# Patient Record
Sex: FEMALE | Race: WHITE | URBAN - METROPOLITAN AREA
[De-identification: names, ages, dates, MRNs, and addresses within clinical notes are randomized per-mention and may not be internally consistent; named-entity substitution may affect disease eponyms.]

---

## 2017-11-30 ENCOUNTER — OFFICE VISIT (OUTPATIENT)
Dept: INTERNAL MEDICINE | Facility: CLINIC | Age: 22
End: 2017-11-30

## 2017-11-30 VITALS
RESPIRATION RATE: 20 BRPM | SYSTOLIC BLOOD PRESSURE: 130 MMHG | WEIGHT: 170.1 LBS | DIASTOLIC BLOOD PRESSURE: 86 MMHG | BODY MASS INDEX: 27.45 KG/M2 | OXYGEN SATURATION: 98 % | HEART RATE: 86 BPM

## 2017-11-30 DIAGNOSIS — F32.1 MAJOR DEPRESSIVE DISORDER, SINGLE EPISODE, MODERATE (H): ICD-10-CM

## 2017-11-30 ASSESSMENT — ANXIETY QUESTIONNAIRES
6. BECOMING EASILY ANNOYED OR IRRITABLE: MORE THAN HALF THE DAYS
2. NOT BEING ABLE TO STOP OR CONTROL WORRYING: SEVERAL DAYS
3. WORRYING TOO MUCH ABOUT DIFFERENT THINGS: MORE THAN HALF THE DAYS
1. FEELING NERVOUS, ANXIOUS, OR ON EDGE: SEVERAL DAYS
5. BEING SO RESTLESS THAT IT IS HARD TO SIT STILL: NOT AT ALL
GAD7 TOTAL SCORE: 7
IF YOU CHECKED OFF ANY PROBLEMS ON THIS QUESTIONNAIRE, HOW DIFFICULT HAVE THESE PROBLEMS MADE IT FOR YOU TO DO YOUR WORK, TAKE CARE OF THINGS AT HOME, OR GET ALONG WITH OTHER PEOPLE: SOMEWHAT DIFFICULT
7. FEELING AFRAID AS IF SOMETHING AWFUL MIGHT HAPPEN: SEVERAL DAYS

## 2017-11-30 ASSESSMENT — PATIENT HEALTH QUESTIONNAIRE - PHQ9
SUM OF ALL RESPONSES TO PHQ QUESTIONS 1-9: 17
5. POOR APPETITE OR OVEREATING: NOT AT ALL

## 2017-11-30 ASSESSMENT — PAIN SCALES - GENERAL: PAINLEVEL: NO PAIN (0)

## 2017-11-30 NOTE — PROGRESS NOTES
PRIMARY CARE CENTER       SUBJECTIVE:  Jermaine Iyer is a 22 year old female with a PMHx of depression who comes in for worsening depression    Jermaine was first seen here at the clinic in March of 2016 in her freshman year of college after moving from [place]. She was complaining of depressed mood and anxiety after her move. She was started on Zoloft, her symptoms improved. She went back to [place] for the summer and upon returning here to [place] for school she decided to stop the zoloft, thinking that her depression has resolved after going back home for the summer and now finding friends here in the [facility] and being part of a sorority. Since stopping the medication her depression symptoms remerged. She endorses increased sleep, ernesto eating, depressed mood, decreased interest in her regular activities, she had no interest in going out, starting skipping classes and she did poorly with her classes. She is lives in a sorority house with 4 other roommates and she has a lot of friends however she has no one close to open up to. She feels mostly supported through her family who live in [place].            Mood Symptoms     Concerns: depression    How long have you been feeling this way? 2 months since stopping medication      Description:   Depressed Mood?: YES     Anxious Mood?: No     Accompanying Signs & Symptoms:  Still participating in activities that you used to enjoy?: No  Fatigue:  YES     Irritability:  YES     Difficulty concentrating:  YES     Changes in appetite: No   Problems with sleep: No   ++++++++++++++++++++++++++++++++      Substance Use: Yes:         Alcohol Use:rare 1 drink a week                Other drug use:  Never Used    Social Support           Who do you live with? Lives with 4 roommates          Who do you turn to for support? Family live in [facility]         What makes you feel better?: -         What do you do to manage stress? -          Exercise:walking  4-5 days/week for an average of 15-30 minutes     History  Has there been a time in your life when you needed much less sleep than usual? No   Heart racing/beating fast : No   Thoughts of hurting yourself or others: No   Previous treatment Antidepressants - sertraline (Zoloft)                                  Therapy: No  Today's PHQ-9 Score:   PHQ-9 SCORE 11/30/2017   Total Score 17          MITCHEL Score:  MITCHEL-7 SCORE 11/30/2017   Total Score 7           Medications and allergies reviewed by me today.     ROS:   Constitutional, neuro, ENT, endocrine, pulmonary, cardiac, gastrointestinal, genitourinary, musculoskeletal, integument and psychiatric systems are negative, except as otherwise noted.      OBJECTIVE:/86  Pulse 86  Resp 20  Wt 77.2 kg (170 lb 1.6 oz)  SpO2 98%  BMI 27.45 kg/m2   Wt Readings from Last 1 Encounters:   11/30/17 77.2 kg (170 lb 1.6 oz)       GENERAL APPEARANCE: Tearful, healthy, alert and no distress     EYES: EOMI, PERRL     HENT: ear canals and TM's normal and nose and mouth without ulcers or lesions     NECK: no adenopathy, no asymmetry, masses, or scars and thyroid normal to palpation     RESP: lungs clear to auscultation - no rales, rhonchi or wheezes     CV: regular rates and rhythm, no murmurs or rub     ABDOMEN:  soft, nontender, bowel sounds normal     MS: extremities normal- no gross deformities noted, FROM in all extremities.     SKIN: no suspicious lesions or rashes     NEURO: Normal strength and tone, sensory exam grossly normal, mentation intact and speech normal     PSYCH: mentation appears normal. and affect normal/bright     LYMPHATICS: No cervical, or supraclavicular nodes     ASSESSMENT/PLAN:    Jermaine was seen today for depression.    Diagnoses and all orders for this visit:    Major depressive disorder, single episode, moderate (H)  Depressed mood started after moving here for college in 9/2015, seen at the clinic in March 2016, started on zoloft,  symptoms impoved which prompt her to stop her medication on her own upon returning from [place] for summer vacation. Now returning with the same complains she was seen for in the clinic last year (depressed mood, tearful, decreased interest, ernesto eating, skipping classes, and increased sleep). Will restart her Zoloft. Patient was counseled about not stopping her medication on her own and that she would at least need to take it for a year before considering stopping the zoloft.     Orders:  -     sertraline (ZOLOFT) 50 MG tablet; Take 1 tablet (50 mg) by mouth daily  -     PSYCHOLOGY REFERRAL       Pt should return to clinic for f/u with me in 6 weeks     Kevin Feliciano MD  Nov 30, 2017    Pt was seen and plan of care discussed with Dr perez who agrees with my assessment and plan .     Attestation:  I, Jana Ngo, saw this patient with the resident and agree with the resident s findings and plan of care as documented in the resident s note.      Jana Ngo MD

## 2017-11-30 NOTE — PATIENT INSTRUCTIONS
Please schedule the following appointments:  Health Psychology (Dr. Jade Squires) [phone]  Health Psychology (Dr. Julio Georges) [phone]   Health Psychology (Dr. Citlali Kim) [phone]   Health Psychology (Dr. Noemi Hanks) [phone]  Health Psychology (Dr. Altaf Lee) [phone]

## 2017-11-30 NOTE — MR AVS SNAPSHOT
After Visit Summary   11/30/2017    Jermaine Iyer    MRN: [id number]           Patient Information     Date Of Birth          1995        Visit Information        Provider Department      11/30/2017 8:35 AM Kevin Feliciano MD [facility]        Today's Diagnoses     Major depressive disorder, single episode, moderate (H)          Care Instructions    Please schedule the following appointments:  Health Psychology (Dr. Jade Squires) [phone]  Health Psychology (Dr. Julio Georges) [phone]   Health Psychology (Dr. Citlali Kim) [phone]   Health Psychology (Dr. Noemi Hanks) [phone]  Health Psychology (Dr. Altaf Lee) [phone]             Follow-ups after your visit        Additional Services     PSYCHOLOGY REFERRAL       Your provider has referred you to:  PREFERRED PROVIDERS:    Please be aware that coverage of these services is subject to the terms and limitations of your health insurance plan.  Call member services at your health plan with any benefit or coverage questions.      Please bring the following to your appointment:    >>   Any x-rays, CTs or MRIs which have been performed.  Contact the facility where they were done to arrange for  prior to your scheduled appointment.   >>   List of current medications   >>   This referral request   >>   Any documents/labs given to you for this referral                  Your next 10 appointments already scheduled     Jan 18, 2018  1:05 PM CST   (Arrive by 12:50 PM)   Return Visit with Kevin Feliciano MD   [facility] ([facility])    [address] [place] [id number] [phone]              Who to contact     Please call your clinic at [phone] to:    Ask questions about your health    Make or cancel appointments    Discuss your medicines    Learn about your test results    Speak to your doctor   If you have compliments or concerns  about an experience at your clinic, or if you wish to file a complaint, please contact [facility] Patient Relations at [phone] or email us at [email or web address]         Additional Information About Your Visit        [employer] Information     Help.com is an electronic gateway that provides easy, online access to your medical records. With Help.com, you can request a clinic appointment, read your test results, renew a prescription or communicate with your care team.     To sign up for Help.com visit the website at [email or web address]   You will be asked to enter the access code listed below, as well as some personal information. Please follow the directions to create your username and password.     Your access code is: [email or web address]  Expires: 2018  6:31 AM     Your access code will  in 90 days. If you need help or a new code, please contact your [facility] or call [phone] for assistance.        Care EveryWhere ID     This is your Care EveryWhere ID. This could be used by other organizations to access your [place] medical records  [id number]        Your Vitals Were     Pulse Respirations Pulse Oximetry BMI (Body Mass Index)          86 20 98% 27.45 kg/m2         Blood Pressure from Last 3 Encounters:   17 130/86   16 107/72   16 124/89    Weight from Last 3 Encounters:   17 77.2 kg (170 lb 1.6 oz)   16 69.7 kg (153 lb 9.6 oz) (85 %)*   16 72.2 kg (159 lb 1.6 oz) (89 %)*     * Growth percentiles are based on CDC 2-20 Years data.              We Performed the Following     PSYCHOLOGY REFERRAL          Where to get your medicines      These medications were sent to [facility] [address] [place], [place] - [address]  [address] [facility] [address]     Phone:  [phone]     sertraline 50 MG tablet          Primary Care Provider Office Phone # Fax #    Ana Luisa Alfaro,  [phone]       [address]  [facility] [address]        Equal Access to Services     YESI FLORES : Rosa [place], nya colorado, brianjuli barnettarturjose castlemackjose, bharat ann-mariein hayaaosbaldo castlekeily douglass marco teresa. So [facility] [phone].    ATENCIÓN: Si habla español, tiene a foster disposición servicios gratuitos de asistencia lingüística. Llame al [phone].    We comply with applicable federal civil rights laws and [place] laws. We do not discriminate on the basis of race, color, national origin, age, disability, sex, sexual orientation, or gender identity.            Thank you!     Thank you for choosing [facility] PRIMARY CARE CLINIC  for your care. Our goal is always to provide you with excellent care. Hearing back from our patients is one way we can continue to improve our services. Please take a few minutes to complete the written survey that you may receive in the mail after your visit with us. Thank you!             Your Updated Medication List - Protect others around you: Learn how to safely use, store and throw away your medicines at www.disposemymeds.org.          This list is accurate as of: 11/30/17  9:12 AM.  Always use your most recent med list.                   Brand Name Dispense Instructions for use Diagnosis    sertraline 50 MG tablet    ZOLOFT    30 tablet    Take 1 tablet (50 mg) by mouth daily    Major depressive disorder, single episode, moderate (H)

## 2017-11-30 NOTE — NURSING NOTE
Chief Complaint   Patient presents with     Depression     Patient is here to discuss depression.      Berkley Rodriguez LPN at 8:34 AM on 11/30/2017.

## 2017-12-01 ASSESSMENT — ANXIETY QUESTIONNAIRES: GAD7 TOTAL SCORE: 7

## 2017-12-31 ENCOUNTER — HEALTH MAINTENANCE LETTER (OUTPATIENT)
Age: 22
End: 2017-12-31

## 2018-01-18 ENCOUNTER — OFFICE VISIT (OUTPATIENT)
Dept: INTERNAL MEDICINE | Facility: CLINIC | Age: 23
End: 2018-01-18
Payer: COMMERCIAL

## 2018-01-18 VITALS
RESPIRATION RATE: 16 BRPM | WEIGHT: 169.6 LBS | DIASTOLIC BLOOD PRESSURE: 73 MMHG | HEART RATE: 80 BPM | SYSTOLIC BLOOD PRESSURE: 111 MMHG | BODY MASS INDEX: 27.37 KG/M2

## 2018-01-18 DIAGNOSIS — F32.5 MAJOR DEPRESSIVE DISORDER WITH SINGLE EPISODE, IN FULL REMISSION (H): Primary | ICD-10-CM

## 2018-01-18 ASSESSMENT — ANXIETY QUESTIONNAIRES
GAD7 TOTAL SCORE: 1
6. BECOMING EASILY ANNOYED OR IRRITABLE: NOT AT ALL
7. FEELING AFRAID AS IF SOMETHING AWFUL MIGHT HAPPEN: NOT AT ALL
5. BEING SO RESTLESS THAT IT IS HARD TO SIT STILL: NOT AT ALL
IF YOU CHECKED OFF ANY PROBLEMS ON THIS QUESTIONNAIRE, HOW DIFFICULT HAVE THESE PROBLEMS MADE IT FOR YOU TO DO YOUR WORK, TAKE CARE OF THINGS AT HOME, OR GET ALONG WITH OTHER PEOPLE: NOT DIFFICULT AT ALL
1. FEELING NERVOUS, ANXIOUS, OR ON EDGE: NOT AT ALL
3. WORRYING TOO MUCH ABOUT DIFFERENT THINGS: SEVERAL DAYS
2. NOT BEING ABLE TO STOP OR CONTROL WORRYING: NOT AT ALL

## 2018-01-18 ASSESSMENT — PATIENT HEALTH QUESTIONNAIRE - PHQ9
5. POOR APPETITE OR OVEREATING: NOT AT ALL
SUM OF ALL RESPONSES TO PHQ QUESTIONS 1-9: 2

## 2018-01-18 ASSESSMENT — PAIN SCALES - GENERAL: PAINLEVEL: NO PAIN (0)

## 2018-01-18 NOTE — MR AVS SNAPSHOT
After Visit Summary   2018    Jermaine Iyer    MRN: [id number]           Patient Information     Date Of Birth          1995        Visit Information        Provider Department      2018 1:05 PM Kevin Feliciano MD [facility]        Today's Diagnoses     Major depressive disorder with single episode, in full remission (H)    -  1       Follow-ups after your visit        Follow-up notes from your care team     Return in about 3 months (around 2018) for Depression follow up.      Who to contact     Please call your clinic at [phone] to:    Ask questions about your health    Make or cancel appointments    Discuss your medicines    Learn about your test results    Speak to your doctor   If you have compliments or concerns about an experience at your clinic, or if you wish to file a complaint, please contact [facility] Patient Relations at [phone] or email us at [email or web address]         Additional Information About Your Visit        MyChart Information     VytronUS is an electronic gateway that provides easy, online access to your medical records. With VytronUS, you can request a clinic appointment, read your test results, renew a prescription or communicate with your care team.     To sign up for TenasiTecht visit the website at [email or web address]   You will be asked to enter the access code listed below, as well as some personal information. Please follow the directions to create your username and password.     Your access code is: [email or web address]  Expires: 2018  6:31 AM     Your access code will  in 90 days. If you need help or a new code, please contact your [facility] or call [phone] for assistance.        Care EveryWhere ID     This is your Care EveryWhere ID. This could be used by other organizations to access your [place] medical records  [id number]        Your  Vitals Were     Pulse Respirations Breastfeeding? BMI (Body Mass Index)          80 16 No 27.37 kg/m2         Blood Pressure from Last 3 Encounters:   01/18/18 111/73   11/30/17 130/86   04/07/16 107/72    Weight from Last 3 Encounters:   01/18/18 76.9 kg (169 lb 9.6 oz)   11/30/17 77.2 kg (170 lb 1.6 oz)   04/07/16 69.7 kg (153 lb 9.6 oz) (85 %)*     * Growth percentiles are based on CDC 2-20 Years data.              Today, you had the following     No orders found for display       Primary Care Provider Office Phone # Fax #    Ana Luisa Alfaro [phone]       [address] [address]        Equal Access to Services     YESI FLORES : Hadii es [place], waaxda miroslava, qaybta kaalmada miya, bharat lara . So [facility] [phone].    ATENCIÓN: Si habla español, tiene a foster disposición servicios gratuitos de asistencia lingüística. Meche al [phone].    We comply with applicable federal civil rights laws and [place] laws. We do not discriminate on the basis of race, color, national origin, age, disability, sex, sexual orientation, or gender identity.            Thank you!     Thank you for choosing [facility] PRIMARY CARE CLINIC  for your care. Our goal is always to provide you with excellent care. Hearing back from our patients is one way we can continue to improve our services. Please take a few minutes to complete the written survey that you may receive in the mail after your visit with us. Thank you!             Your Updated Medication List - Protect others around you: Learn how to safely use, store and throw away your medicines at www.disposemymeds.org.          This list is accurate as of: 1/18/18 11:59 PM.  Always use your most recent med list.                   Brand Name Dispense Instructions for use Diagnosis    sertraline 50 MG tablet    ZOLOFT    30 tablet    Take 1 tablet (50 mg) by mouth daily     Major depressive disorder, single episode, moderate (H)

## 2018-01-18 NOTE — PROGRESS NOTES
PRIMARY CARE CENTER       SUBJECTIVE:  Jermaine Iyer is a 22 year old female with a PMHx of depresssion coming here for follow up after restarting her Zoloft.   During the last encounter patient was tearful, endorsing depressed mood, loss of interest, increased sleep, difficulty concentrating and was skipping classes. She had abruptly discontinued her Zoloft on her on and thereafter she started experiencing worsening symptoms of depression. I restart Zoloft which has worked for her in the past and placed a psychology referral. 2-3 after restarting Zoloft she noticed a significant improvement of her symptoms. She feels more engaged, taking more classed, doing better at school. She is sleeping normal hours and is able to wake up early. She is no longer feeling fatigued and irritable. She denies suicidal or self harm ideation. Her PHQ-9 score during this encounter is 2 compared to 17  and MITCHEL-7 is 1 compared to 7 when I last saw her and prior to restarting Zoloft.     She currently has no active complains    Today's PHQ-9 Score: 2    PHQ-9 SCORE 11/30/2017   Total Score 17          MITCHEL Score: 1    MITCHEL-7 SCORE 11/30/2017   Total Score 7       Medications and allergies reviewed by me today.     ROS:   Constitutional, neuro, ENT, endocrine, pulmonary, cardiac, gastrointestinal, genitourinary, musculoskeletal, integument and psychiatric systems are negative, except as otherwise noted.      OBJECTIVE:/73  Pulse 80  Resp 16  Wt 76.9 kg (169 lb 9.6 oz)  Breastfeeding? No  BMI 27.37 kg/m2   Wt Readings from Last 1 Encounters:   01/18/18 76.9 kg (169 lb 9.6 oz)       GENERAL APPEARANCE: healthy, alert and no distress     EYES: EOMI, PERRL     RESP: lungs clear to auscultation     CV: RRR     ABDOMEN:  ND/NT     SKIN: no suspicious lesions or rashes     NEURO: AAOx3, moves all extremities equally      PSYCH: mentation appears normal. and affect normal/bright     ASSESSMENT/PLAN:    - Depression  Her  depression symptoms have significantly improved since I saw her on 11/30/17 and restarted Zoloft. PHQ-9 score is 2 from 17 previously. Patient was unable to follow up with psychology because no appointments were available prior to her winter break then she ended up not calling back. I encouraged Jermaine to continue to take her Zoloft for likely another 6 months- 1 year and not to stop it on her own. She was also encouraged to follow up with psychology at the closest opportunistic       Pt should return to clinic for f/u with me in 3 months    Kevin Feliciano MD   Internal Medicine, PGY-1  [facility]     Jan 18, 2018    Pt was seen and plan of care discussed with Dr Casillas.   I was present during the visit and the patient was seen and examined by me in conjunction with the resident.  I discussed the pertinent history, exam, results and plan with the resident and agree with the documentation above with the following exceptions: none.    Vivian Casillas MD

## 2018-01-18 NOTE — NURSING NOTE
Chief Complaint   Patient presents with     Depression     pt is here for a depression follow up        Cary Neumann CMA at 1:00 PM on 1/18/2018

## 2018-01-18 NOTE — PROGRESS NOTES
Rooming Note  Health Maintenance   Health Maintenance Due   Topic Date Due     CHLAMYDIA SCREENING  1995     PEDS DTAP/TDAP (1 - Tdap) 08/28/2004     HPV IMMUNIZATION (1 of 3 - Female 3 Dose Series) 08/28/2008     TETANUS IMMUNIZATION (SYSTEM ASSIGNED)  08/28/2015     DEPRESSION ACTION PLAN Q1 YR  08/28/2015     INFLUENZA VACCINE (SYSTEM ASSIGNED)  09/01/2017    The following immunizations were discussed, but NOT ordered:   - Influenza (flu shot)  The orders were not placed because: patient declined

## 2018-01-19 ASSESSMENT — ANXIETY QUESTIONNAIRES: GAD7 TOTAL SCORE: 1

## 2018-02-23 DIAGNOSIS — F32.1 MAJOR DEPRESSIVE DISORDER, SINGLE EPISODE, MODERATE (H): ICD-10-CM

## 2018-02-25 NOTE — TELEPHONE ENCOUNTER
sertraline (ZOLOFT) 50 MG tablet  Last Written Prescription Date:  11/30/17  Last Fill Quantity: 30,   # refills: 3  Last Office Visit 1/18/18  Future Office visit:  None

## 2018-03-08 ENCOUNTER — OFFICE VISIT (OUTPATIENT)
Dept: PSYCHOLOGY | Facility: CLINIC | Age: 23
End: 2018-03-08

## 2018-03-08 DIAGNOSIS — F54 PSYCHOLOGICAL FACTORS AFFECTING MORBID OBESITY (H): ICD-10-CM

## 2018-03-08 DIAGNOSIS — F33.1 MAJOR DEPRESSIVE DISORDER, RECURRENT, MODERATE (H): Primary | ICD-10-CM

## 2018-03-08 DIAGNOSIS — F50.814 BINGE-EATING DISORDER, IN PARTIAL REMISSION, MILD: ICD-10-CM

## 2018-03-08 DIAGNOSIS — E66.01 PSYCHOLOGICAL FACTORS AFFECTING MORBID OBESITY (H): ICD-10-CM

## 2018-03-08 NOTE — MR AVS SNAPSHOT
After Visit Summary   3/8/2018    Jermaine Iyer    MRN: [id number]           Patient Information     Date Of Birth          1995        Visit Information        Provider Department      3/8/2018 1:00 PM Jade Squires, PhD [facility]        Today's Diagnoses     Major depressive disorder, recurrent, moderate (H)    -  1    Psychological factors affecting morbid obesity (H)        Binge-eating disorder, in partial remission, mild           Follow-ups after your visit        Who to contact     Please call your clinic at [phone] to:    Ask questions about your health    Make or cancel appointments    Discuss your medicines    Learn about your test results    Speak to your doctor            Additional Information About Your Visit        MyChart Information     Plato Network[employer] is an electronic gateway that provides easy, online access to your medical records. With TerraWi, you can request a clinic appointment, read your test results, renew a prescription or communicate with your care team.     To sign up for [employer] Network[employer] visit the website at [email or web address]   You will be asked to enter the access code listed below, as well as some personal information. Please follow the directions to create your username and password.     Your access code is: [email or web address]  Expires: 2018  7:30 AM     Your access code will  in 90 days. If you need help or a new code, please contact your [facility] or call [phone] for assistance.        Care EveryWhere ID     This is your Care EveryWhere ID. This could be used by other organizations to access your [place] medical records  [id number]         Blood Pressure from Last 3 Encounters:   18 111/73   17 130/86   16 107/72    Weight from Last 3 Encounters:   18 76.9 kg (169 lb 9.6 oz)   17 77.2 kg (170 lb 1.6 oz)   16 69.7 kg (153 lb 9.6 oz) (85 %)*     * Growth  percentiles are based on [facility] 2-20 Years data.              Today, you had the following     No orders found for display       Primary Care Provider Office Phone # Fax #    Ana Luisa Alfaro [phone]       [address]  [facility] [address]        Equal Access to Services     YESI FLORES : Hadii aad ku hadasho Soomaali, waaxda luqadaha, qaybta kaalmada adeegyada, waxay idiin hayaan adeeg kharash latracy . So [facility] [phone].    ATENCIÓN: Si habla español, tiene a foster disposición servicios gratuitos de asistencia lingüística. Meche al [phone].    We comply with applicable federal civil rights laws and [place] laws. We do not discriminate on the basis of race, color, national origin, age, disability, sex, sexual orientation, or gender identity.            Thank you!     Thank you for choosing [facility] CL[facility]  for your care. Our goal is always to provide you with excellent care. Hearing back from our patients is one way we can continue to improve our services. Please take a few minutes to complete the written survey that you may receive in the mail after your visit with us. Thank you!             Your Updated Medication List - Protect others around you: Learn how to safely use, store and throw away your medicines at www.disposemymeds.org.          This list is accurate as of 3/8/18 11:59 PM.  Always use your most recent med list.                   Brand Name Dispense Instructions for use Diagnosis    sertraline 50 MG tablet    ZOLOFT    30 tablet    Take 1 tablet (50 mg) by mouth daily    Major depressive disorder, single episode, moderate (H)

## 2018-03-20 NOTE — PROGRESS NOTES
"Health Psychology                                      Department of Medicine                                           [facility] Mail Code 749    Citlali Kim, Ph.D., L.P. [phone]  [address] [place] [address] Altaf Lee, Ph.D.,  L.P. [phone]  [place] [address]  Julio Georges, Ph.D., A.B.P.P., L.P. [phone]    CHIEF COMPLAINT/REASON FOR VISIT  Psychological treatment for coping with recurrent depression, binge eating and overweight status with body image dissatisfaction.    Patient was seen today for a 60 minute psychotherapy session in clinical health psychology.        HISTORY OF PRESENT ILLNESS  The patient is a pleasant 22 year old single,  female who is a liban in college.  She first presented with symptoms consistent with a major depressive epidode in X and responding well to Zoloft 50mg.  She discontinued the medicaiton and was again experiencing symptoms of depression and came in for an evaluation.  She reported that she cries frequently \"for no reason\" and has difficulty motivating herself to get out of bed.  She has missed her college classes and has been declining social invitations.  She described procrastination and that she has very low motivation to complete tasks.  She described doing the minimum to stay afloat with her responsibilities.  She reported that she believes she is a disappointment to her parents because of her weight.  She reported that she sleeps from 11pm to 8am when she can, but that she works at 7am two days per week.      She also described a two year history of weight challenges.  She currently described body dissatisfaction.  She is at 175 pounds and prefers that her weight be at less than 150.  She reported that she comes from a family that is oriented toward fitness and health and that she is the only one who struggles to keep her weight in a healthy range.  She has a vacation with " her family coming up and she is concerned about her appearance goarn bathing suit.  She endorsed binge eating episodes and reported that she is aware that she turns to food as a comfort.  Although she denied stress eating, she reported that her ability to resist her problem food, chocolate, is lower when stressed.  She denied any problems with her weight until moving from [place] to [place] for college.  She described binge eating for the first few months after beginning her freshman year and gaining 20 pounds.  She denied binge eating to the same degree but continues to binge on 2 occasions per week.      She works part time in a retail store and has access to snack foods that have been difficult to avoid.  The patient is noted to voice comments that are self derogatory and discussion of self compassion and self acceptance was initiated.  She currently has a My Fitness Pal application on her phone but she has been using it only intermittently.  Discussed setting a weight loss caloric intake at 1250 calories in order to lose approximately one pound per week.  She reported that she has a gym in the building that she lives in but it is not appealing. She described a desire to obtain a car so that she might join another gym.  She has another family vacation coming up on May 13 and she would like to achieve weight loss before this trip.      She plans to return home to [place] for the summer and will again work in her [employer] company.  She reported that she enjoys this work and is looking forward to pursuing a nursing degree so that she might practice as a nurse practitioner and she plans to pursue a Master's degree in nursing.   She is taking liberal ed classes and is pursuing a nursing degree.    She described a good friendship network.  She is a member of a sorority and lives with her 3 best friends.     SYSTEMS REVIEW  Tobacco:  Patient denied history or current use.     PAST MEDICAL/SURGICAL  HISTORY  Patient denied ever having seen a psychologist, psychiatrist or other type of mental health provider but she was prescribed Zoloft 50mg by he primary.      SOCIAL HISTORY  SOCIAL/DEVELOPMENTAL HISTORY  The patient reported being born and raised in [place].  She reported having 2 younger sisters.  She described her childhood as great and reported that she has been a person who is very active, busy, happy, smiling and excited about her future.  Patient denied any history of being the victim of physical, emotional, or sexual abuse. Patient denied experiencing any learning difficulties in school, and stated that she graduated from  high school and had hoped to be admitted into an RN program but she was not.  Therefore, she is pursuing a liberal arts degree and is taking nursing classes and plans to get a Masters degree in nursing.  Patient denied any  service.  She is employed as a  in a retail store that is beneath her apartment.  She has 2 early morning shifts and works 15-20 hours per week.  She is primarily supported by her parents but she is responsible for her grocery money and incidentals.  She described enjoying her work and finding it easy.  She is enrolled in college full time.         Patient has had a boyfriend in the past but she is not in a relationship at this time.  She does not have children.  She reported that her close relationship with her mother and her roomates are her best support system at present.  Recreational activities are reported to include sorority activities and she attends weekly meetings and given that she is a part of a new college chapter, she is involved in several volunteer activities (toy drive for example).  .     FAMILY HISTORY  Patient denied any known history of psychological or psychiatric disorders in family.    MENTAL STATUS EXAMINATION  Appearance/Behavior: The patient was on time, appropriately groomed and dressed, and demonstrated good  eye contact. Her speech was clear and consistent, and there was no evidence of psychomotor agitation.   Consciousness/Orientation: Alert and oriented to person, place, time, and situation.   Cooperation/Reliability: The patient appeared to honestly respond to questions about psychosocial functioning. Patient is deemed a reliable historian.   Mood/Affect: Mood dysphoric with tearful affect.       Appetite: Patient described good appetite.    Sleep: Patient denied any difficulties falling asleep or staying asleep.    Body image: Patient endorsed weight concerns and body image dissatisfaction.    Speech/Language: Speech was logical and coherent. Speech was of normal rate, rhythm and volume.   Thought Form: Overall logical and organized   Thought Content:  Appropriate to interview and situation. Patient appeared focused on her health and concerns about depression and low motivation and the impact of these on her college coursework.   Perception: Patient denied any difficulties with a thought disorder, including auditory or visual hallucinations. Denied any history of obsessive-compulsive disorder or of maryjane.   Cognition/Memory: No difficulties apparent upon interview; not formally assessed.    Fund of Knowledge: Consistent with age, level of education, and life experience.   Abstract Reasoning: Appropriate; not formally assessed.  Judgment: No difficulties apparent upon interview.  Insight/Motivation: Appropriate to situation. The patient is seeking assistance for coping with depression and weight loss.  Suicide/Assault:  Patient denies suicidal or assaultive ideation, plan, or intent.    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  She presents for treatment of depression and possible binge eating disorder or weight loss issues.  She has been diagnosed with MDD in the past and was tretesd successfully with Zoloft.  Patient  reported her family and friend support and long term goals in nursing are her best coping strategies at this point.     Time In:  1:00  Time Out:  2:00    DIAGNOSIS:  Axis I Mdd, recurrent, moderate; R/o binge eating disorder   Axis II Deferred   Axis III Please see medical records for details.   Axis IV Psychosocial and Environmental Stressors: overweight and living away from family support     PLAN:  The following recommendations were made to the patient:    1. Follow-up Services: We recommended a follow-up appointment with Dr. Jade Squires in clinical health psychology for cognitive behavioral therapy to aid with adjustment to, and coping with, current depression and assistance in weight loss.     The patient appeared amenable to these recommendations and an appointment will be made with Dr. Jade Squires after patient returns from vacation. We remain available to the patient as needed.    PATIENT EDUCATION:  Ready to learn, no apparent learning barriers were identified; learning preferences include listening. Explained diagnosis and treatment plan; patient expressed understanding of the content.      Jade Squires, Ph.D., L.P.

## 2018-07-20 DIAGNOSIS — F32.1 MAJOR DEPRESSIVE DISORDER, SINGLE EPISODE, MODERATE (H): ICD-10-CM

## 2018-07-20 NOTE — TELEPHONE ENCOUNTER
Sertraline 50 mg tabs      Last Written Prescription Date:  2-27-18  Last Fill Quantity: 30,   # refills: 3  Last Office Visit : 1-18-18  Future Office visit:  none    30 day soha refill sent to the pharmacy - including instructions for patient to call the clinic and schedule an appointment.    Patient was informed she needs to call and schedule an appointment for further refills. She states understanding and that she will call to schedule.      Kathleen M Doege RN

## 2018-08-15 DIAGNOSIS — F32.1 MAJOR DEPRESSIVE DISORDER, SINGLE EPISODE, MODERATE (H): ICD-10-CM

## 2018-08-15 NOTE — TELEPHONE ENCOUNTER
zoloft    Last Written Prescription Date:  7/20/18  Last Fill Quantity: 30,   # refills: 0  Last Office Visit : 9/6/18  Future Office visit:  1/18/18    Routing refill request to nurse for review/approval because:  Drug failed the [facility], [facility] or [facility] refill protocol

## 2018-09-22 DIAGNOSIS — F32.1 MAJOR DEPRESSIVE DISORDER, SINGLE EPISODE, MODERATE (H): ICD-10-CM

## 2018-09-25 ENCOUNTER — OFFICE VISIT (OUTPATIENT)
Dept: INTERNAL MEDICINE | Facility: CLINIC | Age: 23
End: 2018-09-25
Payer: COMMERCIAL

## 2018-09-25 VITALS
BODY MASS INDEX: 26.63 KG/M2 | WEIGHT: 165 LBS | DIASTOLIC BLOOD PRESSURE: 78 MMHG | SYSTOLIC BLOOD PRESSURE: 118 MMHG | HEART RATE: 88 BPM

## 2018-09-25 DIAGNOSIS — F32.1 MAJOR DEPRESSIVE DISORDER, SINGLE EPISODE, MODERATE (H): ICD-10-CM

## 2018-09-25 ASSESSMENT — PAIN SCALES - GENERAL: PAINLEVEL: NO PAIN (0)

## 2018-09-25 NOTE — NURSING NOTE
Chief Complaint   Patient presents with     Depression     depression check up per pt       Vincenzo Hamilton, EMT 8:44 AM on 9/25/2018.

## 2018-09-25 NOTE — PROGRESS NOTES
"  [facility]      History of Present Illness:   Jermaine Iyer is a 23 year old female with a history of depression presenting for medication refill and check-up.    Reports she has overall been doing well in the past few months, and is primarily here in order to obtain a refill on her sertraline medication that she is prescribed for depression. She has not been feeling depressed recently and has been performing activities like studying for and taking the GRE and walking her dog. She endorses some mild anxiety with the college school year starting up (she is a senior at the [employer] studying health and wellness and is hoping to do a Master's for nursing) but feels as though she is managing her busy life much better than in the past. She sleeps more than usual and does still feel tired during the day sometimes now. No recent binge eating. She feels supported by her friends (her longtime childhood friend just transferred to the  and moved in with her). She does not endorse any SI/HI, chest pain, palpitations, panic attacks, headache, fever, nausea, vomiting. Has not gotten flu shot, and reports she gets flu-like symptoms (believes runs in the family). This summer went to [place], and went to 7 countries. [place], [place], [place] and others.    Depression Symptom Review  Sleep changes: sleeping more than usual  Interest: interested in social activities, dog, school  Guilt: not endorsed  Energy: daytime fatigue, \"tired all the time\"  Cognition/Concentration:   Appetite (wt. change):  Psychomotor [agitation (anxiety) or retardations (lethargic)]:  SI/HI: none  No headache, regular.      Assessment & Plan:   Jermaine Iyer is a 23 year old female with a history of depression presenting for medication refill and check-up.    Depression in remission  She appears to overall be doing well and is not currently meeting DSM-V criteria for depression. Still with some fatigue during the day and increased " "time sleeping, but otherwise no guilt, apathy, concentration difficulty, psychomotor retardation, SI/HI. She has been performing her daily activities, getting out of the house, moderating her food intake, walking her dog and seems to be responding well to Zoloft.   - Zoloft 50mg daily    BMI 26.6  Down four pounds from last visit. Exercising some and walking dog with no recent binge eating.   - Continue healthy diet, exercise and lifestyle change    Birth Control   - Continue norethindrone acet-ethinyl est    Thank you, Walter Vidales (PGY-1 )  [id number]        Review of Systems:   Review of systems completed and otherwise negative.      Past Medical History:     Past Medical History:   Diagnosis Date     Acute streptococcal pharyngitis 10/2015     Impetigo any site 10/2015    On chin after Streptococcal pharyngitis         Past Surgical History:     Past Surgical History:   Procedure Laterality Date     Birth kendall N/A 1996    Covered \"3/4 of forehead, 4 major surgeries at age 4yo\"        Social History:   Employment history: works at retail shop  Hobbies/ free-time activities: takes dogs for walks, goes shopping, watches netflix  Education: [employer]  Substance:    EtOH drinks per week: 3 every two weeks, max 5 so     Tobacco: never     Recreational drugs: none    IVDU: no  Sexually active, protection, previous STI:  No,     Social History     Social History     Marital status: Single     Spouse name: N/A     Number of children: N/A     Years of education: N/A     Occupational History     Not on file.     Social History Main Topics     Smoking status: Never Smoker     Smokeless tobacco: Never Used     Alcohol use 1.2 oz/week     2 Standard drinks or equivalent per week     Drug use: No     Sexual activity: No     Other Topics Concern     Not on file     Social History Narrative    Employment history: works at retail shop    Hobbies/ free-time activities: takes dogs for walks, goes shopping, watches netflix    Education: [employer] senior 2018 studying Wellness and hoping to do Master's in Nursing    Went to [place] summer 2018 and to multiple countries.        Family History:   No family history on file.     Allergies:    No Known Allergies       Medications:     Current Outpatient Prescriptions   Medication     Norethindrone Acet-Ethinyl Est (LOESTRIN 1.5/30, 21, PO)     sertraline (ZOLOFT) 50 MG tablet     [DISCONTINUED] sertraline (ZOLOFT) 50 MG tablet     No current facility-administered medications for this visit.           Physical Exam:   /78 (BP Location: Right arm, Patient Position: Sitting, Cuff Size: Adult Regular)  Pulse 88  Wt 74.8 kg (165 lb)  BMI 26.63 kg/m2  Wt:   Wt Readings from Last 4 Encounters:   09/25/18 74.8 kg (165 lb)   01/18/18 76.9 kg (169 lb 9.6 oz)   11/30/17 77.2 kg (170 lb 1.6 oz)   04/07/16 69.7 kg (153 lb 9.6 oz) (85 %)*     * Growth percentiles are based on CDC 2-20 Years data.      BP Readings from Last 6 Encounters:   09/25/18 118/78   01/18/18 111/73   11/30/17 130/86   04/07/16 107/72   03/03/16 124/89   09/19/15 122/79       Constitutional: not dyspneic/diaphoretic, no acute distress  Mood:  cooperative, pleasant  Eyes: Sclera anicteric/injected  Ears/nose/mouth/throat: Normal oropharynx without ulcers or exudate, mucus membranes moist, hearing intact  Neck: supple, thyroid normal size  CV: Regular rate and rhythm, no edema  Respiratory: Unlabored breathing  Lymph: No axillary, submandibular, supraclavicular or inguinal lymphadenopathy  Abd: Nondistended  Skin: warm, perfused, no jaundice  Neuro: AAO x 3  Psych: Normal affect  MSK: Normal gait      Lab and Data:   None      Pt was seen and examined with Dr. Vidales.  I agree with his documentation as noted above.    My additional comments: None    Mj Mcclellan

## 2018-09-25 NOTE — MR AVS SNAPSHOT
After Visit Summary   9/25/2018    Jermaine Iyer    MRN: [id number]           Patient Information     Date Of Birth          1995        Visit Information        Provider Department      9/25/2018 8:40 AM Walter Vidales MD [facility]        Today's Diagnoses     Major depressive disorder, single episode, moderate (H)          Care Instructions    Primary Care Center Medication Refill Request Information:  * Please contact your pharmacy regarding ANY request for medication refills.  ** [facility] Prescription Fax = [phone]  * Please allow 3 business days for routine medication refills.  * Please allow 5 business days for controlled substance medication refills.     Primary Care Center Test Result notification information:  *You will be notified with in 7-10 days of your appointment day regarding the results of your test.  If you are on MyChart you will be notified as soon as the provider has reviewed the results and signed off on them.    Primary Care Center: [phone]                [facility]         Internal Medicine Resident                   Continuity Clinic    Who We Are    Resident Continuity Clinic is a part of the [facility].  Resident physicians see patients independently and establish a relationship with them over the course of their three-year residency program.  As with the Primary Care Clinic, our Resident Continuity Clinic models a group practice.  If your doctor is not available, you will be able to see another resident physician.  At the end of a resident s training, patients will be transitioned to a new resident physician for ongoing care.     We treat patients with a wide array of medical needs from routine physicals, to acute illnesses, to diabetes and blood pressure management, to complex medical illness.  What is a Resident Physician?    Resident physicians hold medical degrees and are doctors. They are training to  become specialists in Internal Medicine. They work under the supervision of board-certified faculty physicians.  Expectations for Your Care    We strive to provide accessible, quality care at all times.    In order to provide this care, it is best to see your primary care resident doctor consistently rather switching between providers.  In the event you do see another physician, you should schedule a follow-up visit with your usual primary care doctor.    If you are transitioning your care from another clinic, it is helpful to have your records available for your doctor to review.    We do not prescribe controlled substances, such as ADD medications or narcotic pain medications, on your first visit.  We will review your health records and concerns prior to devising a treatment plan with you in order to provide the best care.      Clinic Services     Extended clinic hours; patient  to help navigate your visit;  parking; laboratory and imaging services with evening and weekend hours    Multiple medical and surgical specialties in one building    Complementary services, including Nutrition, Integrative Medicine, Pharmacy consultations, Mental and Behavioral Health, Sports Medicine and Physical Therapy    Thank You    We would like to thank you for choosing the [facility] Resident Continuity Clinic for your primary care. You are making a priceless contribution to the training of the next generation of health care practitioners.     Contact us at [phone] for appointments or questions.    Resident Clinic Hours are Tuesdays and Thursdays, 7:30am-5:00pm    Residents  Cary Vilchis MD   (Female )   Surekha Rhodes MD   (Female)   Lyle Armenta MD  (Male)   Aleksey Bowie MD  (Male)   Akosua Pinto MD   (Female)   Brock Whitney MD  (Male)    Tor Sands MD  (Male)   Herb Lorenzo MD  (Male)   Aleksey Ling MD (Male)   Kimo Jarrett MD  (Male)   Desire  MD Wilda (Female)    Briseyda Saucedo MD (Female)   Danielle Feliciano MD  (Male)   Maria L Ojeda MD(Female)   Clau Schaefer MD  (Female)    Supervising Physicians   MD Jana Veliz, MD Terry Roy, MD Mj Mcclellan, MD Tomeka Ferguson, MD Vivian Casillas, MD Rosalio Russ, MD Myrna Tineo, MD Yamileth Verduzco MD                    Follow-ups after your visit        Follow-up notes from your care team     Return in about 1 year (around 2019).      Who to contact     Please call your clinic at [phone] to:    Ask questions about your health    Make or cancel appointments    Discuss your medicines    Learn about your test results    Speak to your doctor            Additional Information About Your Visit        MyChart Information     [employer] is an electronic gateway that provides easy, online access to your medical records. With [employer], you can request a clinic appointment, read your test results, renew a prescription or communicate with your care team.     To sign up for [employer] visit the website at [email or web address]   You will be asked to enter the access code listed below, as well as some personal information. Please follow the directions to create your username and password.     Your access code is: [email or web address]  Expires: 2018  6:30 AM     Your access code will  in 90 days. If you need help or a new code, please contact your [facility] or call [phone] for assistance.        Care EveryWhere ID     This is your Care EveryWhere ID. This could be used by other organizations to access your [place] medical records  [id number]        Your Vitals Were     Pulse BMI (Body Mass Index)                88 26.63 kg/m2           Blood Pressure from Last 3 Encounters:   18 118/78   18 111/73   17 130/86    Weight from Last 3 Encounters:   18 74.8 kg (165 lb)   18 76.9 kg  (169 lb 9.6 oz)   11/30/17 77.2 kg (170 lb 1.6 oz)              Today, you had the following     No orders found for display         Today's Medication Changes          These changes are accurate as of 9/25/18  9:38 AM.  If you have any questions, ask your nurse or doctor.               These medicines have changed or have updated prescriptions.        Dose/Directions    sertraline 50 MG tablet   Commonly known as:  ZOLOFT   This may have changed:  See the new instructions.   Used for:  Major depressive disorder, single episode, moderate (H)   Changed by:  Walter Vidales MD        Dose:  50 mg   Take 1 tablet (50 mg) by mouth daily   Quantity:  30 tablet   Refills:  0            Where to get your medicines      These medications were sent to [place] [id number] [place] [facility] [place], [place] - [address]  [address] [facility] [address]     Phone:  [phone]     sertraline 50 MG tablet                Primary Care Provider Office Phone # Fax #    Ana Luisa Alfaro [phone]       [facility] [address] [facility] [address]  [facility] [address]        Equal Access to Services     [facility]: Hadii es hsu hadasho Sofiona, waaxda luqadaha, qaybta kaalmada adeoly, bharat teresa. So [facility] [phone].    ATENCIÓN: Si habla español, tiene a foster disposición servicios gratuitos de asistencia lingüística. Meche al [phone].    We comply with applicable federal civil rights laws and [place] laws. We do not discriminate on the basis of race, color, national origin, age, disability, sex, sexual orientation, or gender identity.            Thank you!     Thank you for choosing [facility]  for your care. Our goal is always to provide you with excellent care. Hearing back from our patients is one way we can continue to improve our services. Please take a few minutes to complete the written survey that you may receive in the mail after your  visit with us. Thank you!             Your Updated Medication List - Protect others around you: Learn how to safely use, store and throw away your medicines at www.disposemymeds.org.          This list is accurate as of 9/25/18  9:38 AM.  Always use your most recent med list.                   Brand Name Dispense Instructions for use Diagnosis    LOESTRIN 1.5/30 (21) PO           sertraline 50 MG tablet    ZOLOFT    30 tablet    Take 1 tablet (50 mg) by mouth daily    Major depressive disorder, single episode, moderate (H)

## 2018-10-30 ENCOUNTER — HEALTH MAINTENANCE LETTER (OUTPATIENT)
Age: 23
End: 2018-10-30

## 2019-02-04 DIAGNOSIS — F32.1 MAJOR DEPRESSIVE DISORDER, SINGLE EPISODE, MODERATE (H): ICD-10-CM

## 2019-05-25 DIAGNOSIS — F32.1 MAJOR DEPRESSIVE DISORDER, SINGLE EPISODE, MODERATE (H): ICD-10-CM

## 2019-05-28 NOTE — TELEPHONE ENCOUNTER
sertraline (ZOLOFT) 50 MG tablet      Last Written Prescription Date:  2/5/19  Last Fill Quantity: 90,   # refills: 0  Last Office Visit : 9/25/18  Future Office visit:  none    90 day to pharmacy. Patient instructed to call clinic for follow up appointment.        Scheduling has been notified to contact the pt for follow up appointment and PHQ9.

## 2019-09-10 DIAGNOSIS — F32.1 MAJOR DEPRESSIVE DISORDER, SINGLE EPISODE, MODERATE (H): ICD-10-CM

## 2019-09-13 NOTE — TELEPHONE ENCOUNTER
Last Clinic Visit: 9/25/2018  [facility]  Scheduling has been notified to contact the pt for appointment.

## 2019-09-16 NOTE — TELEPHONE ENCOUNTER
Tried calling patient, no answered. Left message with clinic number requesting patient to call back to schedule annual exam due this month.

## 2019-11-12 ENCOUNTER — TELEPHONE (OUTPATIENT)
Dept: FAMILY MEDICINE | Age: 24
End: 2019-11-12

## 2019-11-14 ENCOUNTER — APPOINTMENT (OUTPATIENT)
Dept: FAMILY MEDICINE | Age: 24
End: 2019-11-14

## 2019-11-18 ENCOUNTER — APPOINTMENT (OUTPATIENT)
Dept: FAMILY MEDICINE | Age: 24
End: 2019-11-18

## 2019-11-19 ENCOUNTER — OFFICE VISIT (OUTPATIENT)
Dept: FAMILY MEDICINE | Age: 24
End: 2019-11-19

## 2019-11-19 ENCOUNTER — LAB SERVICES (OUTPATIENT)
Dept: LAB | Age: 24
End: 2019-11-19

## 2019-11-19 VITALS
HEIGHT: 66 IN | RESPIRATION RATE: 16 BRPM | HEART RATE: 86 BPM | WEIGHT: 154.32 LBS | DIASTOLIC BLOOD PRESSURE: 80 MMHG | BODY MASS INDEX: 24.8 KG/M2 | SYSTOLIC BLOOD PRESSURE: 110 MMHG

## 2019-11-19 DIAGNOSIS — Z23 NEED FOR VACCINATION: ICD-10-CM

## 2019-11-19 DIAGNOSIS — Z00.00 ANNUAL PHYSICAL EXAM: Primary | ICD-10-CM

## 2019-11-19 DIAGNOSIS — Z11.1 SCREENING EXAMINATION FOR PULMONARY TUBERCULOSIS: ICD-10-CM

## 2019-11-19 DIAGNOSIS — Z86.59 H/O: DEPRESSION: ICD-10-CM

## 2019-11-19 PROCEDURE — 90686 IIV4 VACC NO PRSV 0.5 ML IM: CPT | Performed by: FAMILY MEDICINE

## 2019-11-19 PROCEDURE — 90471 IMMUNIZATION ADMIN: CPT | Performed by: FAMILY MEDICINE

## 2019-11-19 PROCEDURE — 99385 PREV VISIT NEW AGE 18-39: CPT | Performed by: FAMILY MEDICINE

## 2019-11-19 PROCEDURE — 36415 COLL VENOUS BLD VENIPUNCTURE: CPT | Performed by: INTERNAL MEDICINE

## 2019-11-19 PROCEDURE — 90472 IMMUNIZATION ADMIN EACH ADD: CPT | Performed by: FAMILY MEDICINE

## 2019-11-19 PROCEDURE — 86480 TB TEST CELL IMMUN MEASURE: CPT | Performed by: INTERNAL MEDICINE

## 2019-11-19 PROCEDURE — 90715 TDAP VACCINE 7 YRS/> IM: CPT | Performed by: FAMILY MEDICINE

## 2019-11-19 RX ORDER — NORETHINDRONE ACETATE AND ETHINYL ESTRADIOL AND FERROUS FUMARATE 1MG-20(21)
KIT ORAL
COMMUNITY
Start: 2019-11-07

## 2019-11-19 ASSESSMENT — PATIENT HEALTH QUESTIONNAIRE - PHQ9
SUM OF ALL RESPONSES TO PHQ9 QUESTIONS 1 AND 2: 0
SUM OF ALL RESPONSES TO PHQ9 QUESTIONS 1 AND 2: 0
1. LITTLE INTEREST OR PLEASURE IN DOING THINGS: NOT AT ALL
2. FEELING DOWN, DEPRESSED OR HOPELESS: NOT AT ALL

## 2019-11-20 LAB
GAMMA INTERFERON BACKGROUND BLD IA-ACNC: 0.01 IU/ML
M TB IFN-G BLD-IMP: NEGATIVE
M TB IFN-G CD4+ BCKGRND COR BLD-ACNC: 0.01 IU/ML
M TB IFN-G CD4+CD8+ BCKGRND COR BLD-ACNC: 0.01 IU/ML
MITOGEN IGNF BCKGRD COR BLD-ACNC: 8.85 IU/ML

## 2019-11-21 ENCOUNTER — TELEPHONE (OUTPATIENT)
Dept: FAMILY MEDICINE | Age: 24
End: 2019-11-21

## 2019-12-06 ENCOUNTER — TELEPHONE (OUTPATIENT)
Dept: FAMILY MEDICINE | Age: 24
End: 2019-12-06

## 2019-12-06 DIAGNOSIS — R76.0 HIGH SERUM VARICELLA ZOSTER VIRUS (VZV) ANTIBODY TITER: Primary | ICD-10-CM

## 2019-12-16 ENCOUNTER — LAB SERVICES (OUTPATIENT)
Dept: LAB | Age: 24
End: 2019-12-16

## 2019-12-16 ENCOUNTER — E-ADVICE (OUTPATIENT)
Dept: FAMILY MEDICINE | Age: 24
End: 2019-12-16

## 2019-12-16 DIAGNOSIS — R76.0 HIGH SERUM VARICELLA ZOSTER VIRUS (VZV) ANTIBODY TITER: ICD-10-CM

## 2019-12-16 PROCEDURE — 36415 COLL VENOUS BLD VENIPUNCTURE: CPT | Performed by: INTERNAL MEDICINE

## 2019-12-16 PROCEDURE — 86787 VARICELLA-ZOSTER ANTIBODY: CPT | Performed by: INTERNAL MEDICINE

## 2019-12-17 ENCOUNTER — E-ADVICE (OUTPATIENT)
Dept: FAMILY MEDICINE | Age: 24
End: 2019-12-17

## 2019-12-17 ENCOUNTER — TELEPHONE (OUTPATIENT)
Dept: FAMILY MEDICINE | Age: 24
End: 2019-12-17

## 2019-12-17 LAB — VZV IGG SER IA-ACNC: NORMAL

## 2019-12-18 ENCOUNTER — E-ADVICE (OUTPATIENT)
Dept: FAMILY MEDICINE | Age: 24
End: 2019-12-18

## 2020-01-22 ENCOUNTER — E-ADVICE (OUTPATIENT)
Dept: FAMILY MEDICINE | Age: 25
End: 2020-01-22

## 2021-01-12 ENCOUNTER — E-ADVICE (OUTPATIENT)
Dept: FAMILY MEDICINE | Age: 26
End: 2021-01-12

## 2021-01-12 DIAGNOSIS — Z11.1 SCREENING-PULMONARY TB: Primary | ICD-10-CM

## 2021-01-13 ENCOUNTER — APPOINTMENT (OUTPATIENT)
Dept: FAMILY MEDICINE | Age: 26
End: 2021-01-13

## 2021-01-14 ENCOUNTER — LAB SERVICES (OUTPATIENT)
Dept: LAB | Age: 26
End: 2021-01-14

## 2021-01-14 DIAGNOSIS — Z11.1 SCREENING-PULMONARY TB: ICD-10-CM

## 2021-01-14 PROCEDURE — 36415 COLL VENOUS BLD VENIPUNCTURE: CPT | Performed by: INTERNAL MEDICINE

## 2021-01-14 PROCEDURE — 86480 TB TEST CELL IMMUN MEASURE: CPT | Performed by: INTERNAL MEDICINE

## 2021-01-15 LAB
GAMMA INTERFERON BACKGROUND BLD IA-ACNC: 0.03 IU/ML
M TB IFN-G BLD-IMP: NEGATIVE
M TB IFN-G CD4+ BCKGRND COR BLD-ACNC: 0 IU/ML
M TB IFN-G CD4+CD8+ BCKGRND COR BLD-ACNC: 0 IU/ML
MITOGEN IGNF BCKGRD COR BLD-ACNC: >10 IU/ML

## 2021-01-18 ENCOUNTER — TELEPHONE (OUTPATIENT)
Dept: FAMILY MEDICINE | Age: 26
End: 2021-01-18

## 2021-04-28 ENCOUNTER — APPOINTMENT (OUTPATIENT)
Dept: LAB | Age: 26
End: 2021-04-28

## 2021-04-28 ENCOUNTER — OFFICE VISIT (OUTPATIENT)
Dept: FAMILY MEDICINE | Age: 26
End: 2021-04-28

## 2021-04-28 VITALS
WEIGHT: 148.26 LBS | RESPIRATION RATE: 16 BRPM | TEMPERATURE: 97.6 F | SYSTOLIC BLOOD PRESSURE: 110 MMHG | HEIGHT: 66 IN | HEART RATE: 70 BPM | BODY MASS INDEX: 23.83 KG/M2 | DIASTOLIC BLOOD PRESSURE: 68 MMHG | OXYGEN SATURATION: 96 %

## 2021-04-28 DIAGNOSIS — Z86.59 H/O: DEPRESSION: ICD-10-CM

## 2021-04-28 DIAGNOSIS — Z00.00 ANNUAL PHYSICAL EXAM: Primary | ICD-10-CM

## 2021-04-28 PROCEDURE — 99395 PREV VISIT EST AGE 18-39: CPT | Performed by: FAMILY MEDICINE

## 2021-04-28 PROCEDURE — 99214 OFFICE O/P EST MOD 30 MIN: CPT | Performed by: FAMILY MEDICINE

## 2021-04-28 PROCEDURE — 80061 LIPID PANEL: CPT | Performed by: INTERNAL MEDICINE

## 2021-04-28 PROCEDURE — 80053 COMPREHEN METABOLIC PANEL: CPT | Performed by: INTERNAL MEDICINE

## 2021-04-28 RX ORDER — SERTRALINE HYDROCHLORIDE 25 MG/1
25 TABLET, FILM COATED ORAL DAILY
Qty: 60 TABLET | Refills: 0 | Status: SHIPPED | OUTPATIENT
Start: 2021-04-28

## 2021-04-28 ASSESSMENT — PATIENT HEALTH QUESTIONNAIRE - PHQ9
1. LITTLE INTEREST OR PLEASURE IN DOING THINGS: NOT AT ALL
SUM OF ALL RESPONSES TO PHQ9 QUESTIONS 1 AND 2: 0
2. FEELING DOWN, DEPRESSED OR HOPELESS: NOT AT ALL
CLINICAL INTERPRETATION OF PHQ2 SCORE: NO FURTHER SCREENING NEEDED
SUM OF ALL RESPONSES TO PHQ9 QUESTIONS 1 AND 2: 0
CLINICAL INTERPRETATION OF PHQ9 SCORE: NO FURTHER SCREENING NEEDED

## 2021-04-29 LAB
ALBUMIN SERPL-MCNC: 4.1 G/DL (ref 3.6–5.1)
ALBUMIN/GLOB SERPL: 1.2 {RATIO} (ref 1–2.4)
ALP SERPL-CCNC: 78 UNITS/L (ref 45–117)
ALT SERPL-CCNC: 13 UNITS/L
ANION GAP SERPL CALC-SCNC: 7 MMOL/L (ref 10–20)
AST SERPL-CCNC: 13 UNITS/L
BILIRUB SERPL-MCNC: 0.3 MG/DL (ref 0.2–1)
BUN SERPL-MCNC: 16 MG/DL (ref 6–20)
BUN/CREAT SERPL: 15 (ref 7–25)
CALCIUM SERPL-MCNC: 9.4 MG/DL (ref 8.4–10.2)
CHLORIDE SERPL-SCNC: 104 MMOL/L (ref 98–107)
CHOLEST SERPL-MCNC: 209 MG/DL
CHOLEST/HDLC SERPL: 3.5 {RATIO}
CO2 SERPL-SCNC: 29 MMOL/L (ref 21–32)
CREAT SERPL-MCNC: 1.05 MG/DL (ref 0.51–0.95)
FASTING DURATION TIME PATIENT: 2 HOURS
FASTING DURATION TIME PATIENT: 2 HOURS
GFR SERPLBLD BASED ON 1.73 SQ M-ARVRAT: 75 ML/MIN/1.73M2
GLOBULIN SER-MCNC: 3.5 G/DL (ref 2–4)
GLUCOSE SERPL-MCNC: 83 MG/DL (ref 65–99)
HDLC SERPL-MCNC: 60 MG/DL
LDLC SERPL CALC-MCNC: 124 MG/DL
NONHDLC SERPL-MCNC: 149 MG/DL
POTASSIUM SERPL-SCNC: 4.2 MMOL/L (ref 3.4–5.1)
PROT SERPL-MCNC: 7.6 G/DL (ref 6.4–8.2)
SODIUM SERPL-SCNC: 136 MMOL/L (ref 135–145)
TRIGL SERPL-MCNC: 127 MG/DL

## 2021-04-30 ENCOUNTER — TELEPHONE (OUTPATIENT)
Dept: FAMILY MEDICINE | Age: 26
End: 2021-04-30

## 2021-05-01 ENCOUNTER — E-ADVICE (OUTPATIENT)
Dept: FAMILY MEDICINE | Age: 26
End: 2021-05-01

## 2021-05-01 DIAGNOSIS — R89.9 ABNORMAL LABORATORY TEST: Primary | ICD-10-CM

## 2021-12-20 ENCOUNTER — TELEPHONE (OUTPATIENT)
Dept: FAMILY MEDICINE | Age: 26
End: 2021-12-20

## 2022-09-09 ENCOUNTER — WALK IN (OUTPATIENT)
Dept: URGENT CARE | Age: 27
End: 2022-09-09

## 2022-09-09 VITALS
BODY MASS INDEX: 26.66 KG/M2 | RESPIRATION RATE: 16 BRPM | WEIGHT: 160 LBS | HEIGHT: 65 IN | OXYGEN SATURATION: 97 % | HEART RATE: 77 BPM | TEMPERATURE: 98.1 F | SYSTOLIC BLOOD PRESSURE: 112 MMHG | DIASTOLIC BLOOD PRESSURE: 82 MMHG

## 2022-09-09 DIAGNOSIS — L03.211 FACIAL CELLULITIS: Primary | ICD-10-CM

## 2022-09-09 PROCEDURE — 99213 OFFICE O/P EST LOW 20 MIN: CPT | Performed by: FAMILY MEDICINE

## 2022-09-09 RX ORDER — DOXYCYCLINE HYCLATE 100 MG/1
100 CAPSULE ORAL 2 TIMES DAILY
Qty: 20 CAPSULE | Refills: 0 | Status: SHIPPED | OUTPATIENT
Start: 2022-09-09

## 2022-09-09 ASSESSMENT — ENCOUNTER SYMPTOMS
WEAKNESS: 0
TROUBLE SWALLOWING: 0
ACTIVITY CHANGE: 0
NUMBNESS: 0
VOMITING: 0
POLYDIPSIA: 0
DIAPHORESIS: 0
EYE PAIN: 0
SORE THROAT: 0
ABDOMINAL PAIN: 0
COLOR CHANGE: 1
EYE DISCHARGE: 0
DIARRHEA: 0
COUGH: 0
EYE REDNESS: 0
FEVER: 0
NAUSEA: 0
CHILLS: 0
WOUND: 1
SHORTNESS OF BREATH: 0
HEADACHES: 0

## 2022-09-09 ASSESSMENT — PAIN SCALES - GENERAL: PAINLEVEL: 4

## 2024-07-16 ENCOUNTER — WALK IN (OUTPATIENT)
Dept: URGENT CARE | Age: 29
End: 2024-07-16

## 2024-07-16 ENCOUNTER — IMAGING SERVICES (OUTPATIENT)
Dept: GENERAL RADIOLOGY | Age: 29
End: 2024-07-16
Attending: NURSE PRACTITIONER

## 2024-07-16 VITALS
BODY MASS INDEX: 25.29 KG/M2 | HEART RATE: 104 BPM | OXYGEN SATURATION: 97 % | RESPIRATION RATE: 16 BRPM | WEIGHT: 152 LBS | DIASTOLIC BLOOD PRESSURE: 74 MMHG | TEMPERATURE: 96.3 F | SYSTOLIC BLOOD PRESSURE: 126 MMHG

## 2024-07-16 DIAGNOSIS — J06.9 VIRAL URI WITH COUGH: ICD-10-CM

## 2024-07-16 DIAGNOSIS — R05.1 ACUTE COUGH: ICD-10-CM

## 2024-07-16 DIAGNOSIS — R05.1 ACUTE COUGH: Primary | ICD-10-CM

## 2024-07-16 LAB
FLUAV AG UPPER RESP QL IA.RAPID: NEGATIVE
FLUBV AG UPPER RESP QL IA.RAPID: NEGATIVE
SARS-COV+SARS-COV-2 AG RESP QL IA.RAPID: NOT DETECTED

## 2024-07-16 PROCEDURE — 87804 INFLUENZA ASSAY W/OPTIC: CPT | Performed by: NURSE PRACTITIONER

## 2024-07-16 PROCEDURE — 71046 X-RAY EXAM CHEST 2 VIEWS: CPT | Performed by: RADIOLOGY

## 2024-07-16 PROCEDURE — 99214 OFFICE O/P EST MOD 30 MIN: CPT | Performed by: NURSE PRACTITIONER

## 2024-07-16 PROCEDURE — 87426 SARSCOV CORONAVIRUS AG IA: CPT | Performed by: NURSE PRACTITIONER

## 2024-07-16 ASSESSMENT — ENCOUNTER SYMPTOMS
LIGHT-HEADEDNESS: 0
NAUSEA: 0
DIZZINESS: 0
CHILLS: 0
DIARRHEA: 0
RHINORRHEA: 1
VOMITING: 0
SORE THROAT: 0
EYE DISCHARGE: 0
FEVER: 0
COUGH: 1
SHORTNESS OF BREATH: 0
CHEST TIGHTNESS: 0
HEADACHES: 0
FATIGUE: 1
SINUS PRESSURE: 0

## 2024-12-15 NOTE — PATIENT INSTRUCTIONS
[facility] Medication Refill Request Information:  * Please contact your pharmacy regarding ANY request for medication refills.  ** [facility] Prescription Fax = [phone]  * Please allow 3 business days for routine medication refills.  * Please allow 5 business days for controlled substance medication refills.     [facility] Test Result notification information:  *You will be notified with in 7-10 days of your appointment day regarding the results of your test.  If you are on MyChart you will be notified as soon as the provider has reviewed the results and signed off on them.    [facility]: [phone]                [facility]         Internal Medicine Resident                   Continuity Clinic    Who We Are    Resident Continuity Clinic is a part of the [facility].  Resident physicians see patients independently and establish a relationship with them over the course of their three-year residency program.  As with the Primary Care Clinic, our Resident Continuity Clinic models a group practice.  If your doctor is not available, you will be able to see another resident physician.  At the end of a resident s training, patients will be transitioned to a new resident physician for ongoing care.     We treat patients with a wide array of medical needs from routine physicals, to acute illnesses, to diabetes and blood pressure management, to complex medical illness.  What is a Resident Physician?    Resident physicians hold medical degrees and are doctors. They are training to become specialists in Internal Medicine. They work under the supervision of board-certified faculty physicians.  Expectations for Your Care    We strive to provide accessible, quality care at all times.    In order to provide this care, it is best to see your primary care resident doctor consistently rather switching between providers.  In the event you do see another physician, you should  schedule a follow-up visit with your usual primary care doctor.    If you are transitioning your care from another clinic, it is helpful to have your records available for your doctor to review.    We do not prescribe controlled substances, such as ADD medications or narcotic pain medications, on your first visit.  We will review your health records and concerns prior to devising a treatment plan with you in order to provide the best care.      Clinic Services     Extended clinic hours; patient  to help navigate your visit;  parking; laboratory and imaging services with evening and weekend hours    Multiple medical and surgical specialties in one building    Complementary services, including Nutrition, Integrative Medicine, Pharmacy consultations, Mental and Behavioral Health, Sports Medicine and Physical Therapy    Thank You    We would like to thank you for choosing the [facility] Resident Continuity Clinic for your primary care. You are making a priceless contribution to the training of the next generation of health care practitioners.     Contact us at [phone] for appointments or questions.    Resident Clinic Hours are Tuesdays and Thursdays, 7:30am-5:00pm    Residents  Cary Vilchis MD   (Female )   Surekha Rhodes MD   (Female)   Lyle Armenta MD  (Male)   Aleksey Bwoie MD  (Male)   Akosua Pinto MD   (Female)   Brock Whitney MD  (Male)    Tor Sands MD  (Male)   Herb Lorenzo MD  (Male)   Aleksey Ling MD (Male)   Kimo Jarrett MD  (Male)   Desire Astudillo MD (Female)    Briseyda Saucedo MD (Female)   Danielle Feliciano MD  (Male)   Maria L Ojeda MD(Female)   Clau Schaefer MD  (Female)    Supervising Physicians   MD Jana Veliz MD Briar Duffy, MD James Langland, MD Mary Logeais, MD Tanya Melnik, MD Charles Moldow, MD Heather Thompson Buum, MD Kathleen Watson, MD             Unable to assess